# Patient Record
Sex: MALE | Race: WHITE | NOT HISPANIC OR LATINO | URBAN - METROPOLITAN AREA
[De-identification: names, ages, dates, MRNs, and addresses within clinical notes are randomized per-mention and may not be internally consistent; named-entity substitution may affect disease eponyms.]

---

## 2021-11-23 ENCOUNTER — OFFICE (OUTPATIENT)
Dept: URBAN - METROPOLITAN AREA CLINIC 67 | Facility: CLINIC | Age: 81
End: 2021-11-23

## 2021-11-23 VITALS
SYSTOLIC BLOOD PRESSURE: 130 MMHG | HEIGHT: 74 IN | TEMPERATURE: 97.7 F | WEIGHT: 266 LBS | HEART RATE: 74 BPM | OXYGEN SATURATION: 99 % | DIASTOLIC BLOOD PRESSURE: 70 MMHG

## 2021-11-23 DIAGNOSIS — R19.8 OTHER SPECIFIED SYMPTOMS AND SIGNS INVOLVING THE DIGESTIVE S: ICD-10-CM

## 2021-11-23 DIAGNOSIS — R05.3 CHRONIC COUGH: ICD-10-CM

## 2021-11-23 DIAGNOSIS — R14.2 ERUCTATION: ICD-10-CM

## 2021-11-23 PROCEDURE — 99204 OFFICE O/P NEW MOD 45 MIN: CPT | Performed by: INTERNAL MEDICINE

## 2021-11-23 NOTE — SERVICEHPINOTES
Quentin Segal   is seen for an initial visit today - he reports that for several months, he has had a persistent cough and belching. He endorses some globus sensation but no emesis, dysphagia/odynophagia, GI tract bleeding symptoms or unexplained weight loss. He has been seeing Dr. Hwang for possible pulmonary etiology for his chronic cough - Dr. Hwang placed him on Omeprazole for possible extra-esophageal manifestation of reflux but the patient reports that he does not feel it has really helped with his symptoms.
brPer his report, he underwent an EGD and colonoscopy at [place] 1-2 years ago - I do not have a copy of those reports to review myself but the patient reports that those exams were normal/unremarkable.

## 2021-11-23 NOTE — SERVICENOTES
I will have him finish out the last few days of Omeprazole and then stop it as he feels it has not helped with his chronic cough - I will check a barium esophagram and get a copy of his EGD done at [place] 1-2 years ago.